# Patient Record
Sex: MALE
[De-identification: names, ages, dates, MRNs, and addresses within clinical notes are randomized per-mention and may not be internally consistent; named-entity substitution may affect disease eponyms.]

---
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
## 2024-05-07 ENCOUNTER — NON-APPOINTMENT (OUTPATIENT)
Age: 19
End: 2024-05-07

## 2024-07-03 PROBLEM — Z00.00 ENCOUNTER FOR PREVENTIVE HEALTH EXAMINATION: Status: ACTIVE | Noted: 2024-07-03

## 2024-08-01 ENCOUNTER — APPOINTMENT (OUTPATIENT)
Dept: INTERNAL MEDICINE | Facility: CLINIC | Age: 19
End: 2024-08-01

## 2024-08-06 ENCOUNTER — APPOINTMENT (OUTPATIENT)
Dept: GASTROENTEROLOGY | Facility: CLINIC | Age: 19
End: 2024-08-06

## 2024-08-06 PROBLEM — R16.0 HEPATOMEGALY: Status: ACTIVE | Noted: 2024-08-06

## 2024-08-06 PROBLEM — Z87.898 HISTORY OF HEPATOMEGALY: Status: RESOLVED | Noted: 2024-08-06 | Resolved: 2024-08-06

## 2024-08-06 PROCEDURE — 99205 OFFICE O/P NEW HI 60 MIN: CPT

## 2024-08-06 NOTE — PHYSICAL EXAM

## 2024-08-06 NOTE — ASSESSMENT
[FreeTextEntry1] : Patient presents for new patient Gastroenterology evaluation because of ruq fullness, family history of liver disease, and his concern about possible liver injury from his recent respiratory illness.   New test ordered ultrasound of abdomen complete new test ordered lfts, smooth muscle antibody, hepatitis profile ama  Outside records extensively reviewed  High degree of complexity of medical decision making involved in this patient encounter

## 2024-08-06 NOTE — HISTORY OF PRESENT ILLNESS
[FreeTextEntry1] : chief complaint;: ruq discomfort, hepatomeagly   HPI: Patient presents for new patient Gastroenterology evaluation because of hepatomegaly, family history of liver disease as well as  ruq discomfort. Patient with intermittent crampy fullness in ruq. Had a respiratory illness from which he fully recovered, however he and his family are concerned about the possibility of liver injury due to that illness.   Patient denies any history of blood transfusions or needle use. He has not had any occupational exposures to blood products or toxins at his job. 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  Patient denies jaundice, dark urine, acholic stools or pruritis. Weight and appetite have been stable   Extensive outside records were personally reviewed by me on the patient including prior laboratory blood work going back several years, prior physical examination records.

## 2024-08-15 ENCOUNTER — APPOINTMENT (OUTPATIENT)
Dept: ULTRASOUND IMAGING | Facility: CLINIC | Age: 19
End: 2024-08-15
Payer: COMMERCIAL

## 2024-08-15 ENCOUNTER — LABORATORY RESULT (OUTPATIENT)
Age: 19
End: 2024-08-15

## 2024-08-15 PROCEDURE — 76700 US EXAM ABDOM COMPLETE: CPT | Mod: 26

## 2024-08-26 ENCOUNTER — APPOINTMENT (OUTPATIENT)
Dept: GASTROENTEROLOGY | Facility: CLINIC | Age: 19
End: 2024-08-26
Payer: COMMERCIAL

## 2024-08-26 DIAGNOSIS — R16.0 HEPATOMEGALY, NOT ELSEWHERE CLASSIFIED: ICD-10-CM

## 2024-08-26 DIAGNOSIS — Z87.898 PERSONAL HISTORY OF OTHER SPECIFIED CONDITIONS: ICD-10-CM

## 2024-08-26 PROCEDURE — 99214 OFFICE O/P EST MOD 30 MIN: CPT

## 2024-08-26 NOTE — ASSESSMENT
[FreeTextEntry1] : Patient presents for Gastroenterology followup  Has no sign of liver disease or of portal hypertension   ultrasonography reveals normal liver  lfts bloodwork are stable  reviewed and reconciled

## 2024-08-26 NOTE — PHYSICAL EXAM

## 2024-08-26 NOTE — HISTORY OF PRESENT ILLNESS
[FreeTextEntry1] : Chief complaint: liver followup   HPI: Patient presents for telehealth followup. His father has a history of hepatitis; patient presented for liver check up. he feels well, denies abdominal pian; has a good appetite and stable weight. No jaundice, dark urine or acholic stool. No pruritis or abdominal swelling.   II reviewed the abdominal ultrasound with the patient which revealed a normal liver   I reviewed iwith the patient the results of his bloodwork which reveal normal liver enzymes and negative viral hepatitis tests   all questions were answered to the satisfaction of the patient   Moderate degree of complexity of medical decision making was involved in this patient encounter